# Patient Record
Sex: FEMALE | Race: ASIAN | NOT HISPANIC OR LATINO | URBAN - METROPOLITAN AREA
[De-identification: names, ages, dates, MRNs, and addresses within clinical notes are randomized per-mention and may not be internally consistent; named-entity substitution may affect disease eponyms.]

---

## 2019-06-27 ENCOUNTER — EMERGENCY (EMERGENCY)
Facility: HOSPITAL | Age: 24
LOS: 1 days | Discharge: HOME | End: 2019-06-27
Admitting: EMERGENCY MEDICINE
Payer: OTHER MISCELLANEOUS

## 2019-06-27 VITALS
HEART RATE: 102 BPM | SYSTOLIC BLOOD PRESSURE: 116 MMHG | OXYGEN SATURATION: 100 % | RESPIRATION RATE: 18 BRPM | TEMPERATURE: 100 F | DIASTOLIC BLOOD PRESSURE: 83 MMHG

## 2019-06-27 VITALS
SYSTOLIC BLOOD PRESSURE: 115 MMHG | DIASTOLIC BLOOD PRESSURE: 72 MMHG | RESPIRATION RATE: 20 BRPM | HEART RATE: 102 BPM | OXYGEN SATURATION: 100 % | TEMPERATURE: 97 F

## 2019-06-27 PROCEDURE — 73660 X-RAY EXAM OF TOE(S): CPT | Mod: 26,LT

## 2019-06-27 PROCEDURE — 99283 EMERGENCY DEPT VISIT LOW MDM: CPT

## 2019-06-27 RX ORDER — ACETAMINOPHEN 500 MG
975 TABLET ORAL ONCE
Refills: 0 | Status: COMPLETED | OUTPATIENT
Start: 2019-06-27 | End: 2019-06-27

## 2019-06-27 RX ADMIN — Medication 975 MILLIGRAM(S): at 16:54

## 2019-06-27 NOTE — ED PROVIDER NOTE - CLINICAL SUMMARY MEDICAL DECISION MAKING FREE TEXT BOX
25 y/o female with left toe pain/fracture after table fell. Had outpatient XR which revealed fx however pt does not have report on her. Will repeat XR and consider podiatry consult.

## 2019-06-27 NOTE — ED PROVIDER NOTE - OBJECTIVE STATEMENT
23 y/o female with no pertinent PMHx presents to the ED c/o known left 1st toe fx. Pt states she was picking up a toppled over school desk yesterday when it slipped out of her  and fell on her left foot. Pt saw PCP today for right toe pain and swelling and had XR that showed fracture to left 1st toe. Pt was sent to ED for further evaluation. At time of eval, pt reports pain to toe that is exacerbated with weight bearing. Isolated injury, no other acute complaints at time of eval. 25 y/o female with no pertinent PMHx presents to the ED c/o known left 1st toe fx. Pt states she was picking up a toppled over school desk yesterday when it slipped out of her  and fell on her left foot. Pt saw PCP today for left toe pain and swelling and had XR that showed fracture to left 1st toe. Pt was sent to ED for further evaluation. At time of eval, pt reports pain to toe that is exacerbated with weight bearing. Isolated injury, no other acute complaints at time of eval.

## 2019-06-27 NOTE — ED PROVIDER NOTE - NSFOLLOWUPINSTRUCTIONS_ED_ALL_ED_FT
take 3 advil every 8 hours.  Put weight on heel when walking.   Follow up with Dr. Jean within 1 week - call [phone].  Return to ED for any worsening swelling, redness or fever.

## 2019-06-27 NOTE — ED ADULT TRIAGE NOTE - CHIEF COMPLAINT QUOTE
pt sent over by pmd for right toe pain, swelling, and bruising after a table fell on it yesterday. had out pt xray that showed toe was broken. sent over for further eval. no other injuries, no blood thinners